# Patient Record
Sex: MALE | Race: WHITE | URBAN - METROPOLITAN AREA
[De-identification: names, ages, dates, MRNs, and addresses within clinical notes are randomized per-mention and may not be internally consistent; named-entity substitution may affect disease eponyms.]

---

## 2020-06-09 ENCOUNTER — APPOINTMENT (RX ONLY)
Dept: URBAN - METROPOLITAN AREA CLINIC 319 | Facility: CLINIC | Age: 70
Setting detail: DERMATOLOGY
End: 2020-06-09

## 2020-06-09 VITALS — TEMPERATURE: 97.5 F

## 2020-06-09 DIAGNOSIS — L72.0 EPIDERMAL CYST: ICD-10-CM

## 2020-06-09 PROCEDURE — ? ADDITIONAL NOTES

## 2020-06-09 PROCEDURE — 99202 OFFICE O/P NEW SF 15 MIN: CPT

## 2020-06-09 PROCEDURE — ? DEFER

## 2020-06-09 PROCEDURE — ? COUNSELING

## 2020-06-09 ASSESSMENT — LOCATION ZONE DERM: LOCATION ZONE: TRUNK

## 2020-06-09 ASSESSMENT — LOCATION SIMPLE DESCRIPTION DERM: LOCATION SIMPLE: LEFT UPPER BACK

## 2020-06-09 ASSESSMENT — LOCATION DETAILED DESCRIPTION DERM: LOCATION DETAILED: LEFT SUPERIOR MEDIAL UPPER BACK

## 2020-06-09 NOTE — PROCEDURE: DEFER
Detail Level: Detailed
Scheduling Instructions (Optional): 45 minutes cyst surgery
Introduction Text (Please End With A Colon): The following procedure was deferred:

## 2022-02-10 ENCOUNTER — APPOINTMENT (RX ONLY)
Dept: URBAN - METROPOLITAN AREA CLINIC 319 | Facility: CLINIC | Age: 72
Setting detail: DERMATOLOGY
End: 2022-02-10

## 2022-02-10 ENCOUNTER — RX ONLY (OUTPATIENT)
Age: 72
Setting detail: RX ONLY
End: 2022-02-10

## 2022-02-10 DIAGNOSIS — L72.8 OTHER FOLLICULAR CYSTS OF THE SKIN AND SUBCUTANEOUS TISSUE: ICD-10-CM

## 2022-02-10 DIAGNOSIS — D22 MELANOCYTIC NEVI: ICD-10-CM

## 2022-02-10 PROBLEM — D22.5 MELANOCYTIC NEVI OF TRUNK: Status: ACTIVE | Noted: 2022-02-10

## 2022-02-10 PROCEDURE — ? FULL BODY SKIN EXAM - DECLINED

## 2022-02-10 PROCEDURE — ? COUNSELING

## 2022-02-10 PROCEDURE — 99213 OFFICE O/P EST LOW 20 MIN: CPT

## 2022-02-10 PROCEDURE — ? ADDITIONAL NOTES

## 2022-02-10 PROCEDURE — ? PRESCRIPTION

## 2022-02-10 RX ORDER — CEPHALEXIN 500 MG/1
TABLET ORAL
Qty: 42 | Refills: 0 | Status: ERX | COMMUNITY
Start: 2022-02-10

## 2022-02-10 RX ORDER — CEPHALEXIN 500 MG/1
CAPSULE ORAL
Qty: 42 | Refills: 0 | Status: ERX | COMMUNITY
Start: 2022-02-10

## 2022-02-10 RX ADMIN — CEPHALEXIN: 500 TABLET ORAL at 00:00

## 2022-02-10 ASSESSMENT — LOCATION DETAILED DESCRIPTION DERM
LOCATION DETAILED: SUPERIOR THORACIC SPINE
LOCATION DETAILED: LEFT MEDIAL UPPER BACK

## 2022-02-10 ASSESSMENT — LOCATION ZONE DERM: LOCATION ZONE: TRUNK

## 2022-02-10 ASSESSMENT — LOCATION SIMPLE DESCRIPTION DERM
LOCATION SIMPLE: LEFT UPPER BACK
LOCATION SIMPLE: UPPER BACK

## 2022-02-10 NOTE — PROCEDURE: COUNSELING
Patient Specific Counseling (Will Not Stick From Patient To Patient): Discussed that his cyst if 4cm today and if upon return has not decreased to 2cm or less excision may be deferred to general surgery
Detail Level: Simple